# Patient Record
Sex: MALE | Race: OTHER | HISPANIC OR LATINO | URBAN - METROPOLITAN AREA
[De-identification: names, ages, dates, MRNs, and addresses within clinical notes are randomized per-mention and may not be internally consistent; named-entity substitution may affect disease eponyms.]

---

## 2018-06-24 ENCOUNTER — EMERGENCY (EMERGENCY)
Facility: HOSPITAL | Age: 38
LOS: 1 days | Discharge: HOME | End: 2018-06-24
Attending: EMERGENCY MEDICINE
Payer: SELF-PAY

## 2018-06-24 VITALS
HEART RATE: 129 BPM | SYSTOLIC BLOOD PRESSURE: 115 MMHG | DIASTOLIC BLOOD PRESSURE: 65 MMHG | TEMPERATURE: 99 F | RESPIRATION RATE: 18 BRPM | OXYGEN SATURATION: 100 %

## 2018-06-24 PROCEDURE — 82962 GLUCOSE BLOOD TEST: CPT

## 2018-06-24 PROCEDURE — 99285 EMERGENCY DEPT VISIT HI MDM: CPT

## 2018-06-24 PROCEDURE — 99283 EMERGENCY DEPT VISIT LOW MDM: CPT

## 2018-06-24 NOTE — ED PROVIDER NOTE - MEDICAL DECISION MAKING DETAILS
37yo M BIB police for intox - hx unclear. able to hold conversation, no gross signs of trauma or pain. denies pain. will hold labs.
Patient

## 2018-06-24 NOTE — ED PROVIDER NOTE - ATTENDING CONTRIBUTION TO CARE
Male brought to the ED due to his having been found intoxicated outside. He says he drank alcohol but does not provide the type and/or quantity consumed. His intoxication is generalized, constant, mild and not a/w any additional symptoms. On exam, he patient appears well and comfortable but mildly intoxicated. VSs noted, AOB, neck supple, breathing c/ ease, extremities s/ asymmetry, skin s/ rash and neurologically intact. There is nothing clinically evident to suggest any emergent process. Presentation c/w mild alcohol intoxication as opposed to any alternative or additional process. To be discharged once clinically sober.

## 2018-06-24 NOTE — ED ADULT NURSE REASSESSMENT NOTE - NS ED NURSE REASSESS COMMENT FT1
Received report from Vicki Macario RN. Pt observed resting comfortably in bed. Pt denies any needs at this time. Pt provided ginger ale and turkey sandwich. Pt repeatedly expressing want to leave. MD Jolene at bedside and aware.

## 2018-06-24 NOTE — ED PROVIDER NOTE - PROGRESS NOTE DETAILS
Wants to go home. Awake. Alert. Ambulating without difficulty. Suitable for DC at this time. Wants to go home. Awake. Alert. Ambulating without difficulty. Suitable for DC at this time. Patient said his name is Thuan Walters.

## 2018-06-24 NOTE — ED ADULT NURSE NOTE - OBJECTIVE STATEMENT
1830  to [facility] via [facility] ambulance with c/o intoxication. pt drank unknown quantiy of alcohol as per medic pt was found in street pt denies any com,plaints

## 2018-06-24 NOTE — ED PROVIDER NOTE - PHYSICAL EXAMINATION
Gen: tachycardic, speaking in complete sentences. not in distress. able to hold conversation  Head: NCAT  HEENT: PERRL, MMM, normal conjunctiva, anicteric, neck supple  Lung: CTAB, no adventitious sounds  CV: RRR, no murmurs, rubs or gallops  Abd: soft, NTND, no rebound or guarding, no CVAT  MSK: No edema, no visible deformities  Neuro: No focal neurologic deficits. CN II-XII grossly intact. moving all extremities without difficulty  Skin: Warm and dry, no evidence of rash

## 2018-06-24 NOTE — ED PROVIDER NOTE - OBJECTIVE STATEMENT
39yo M with unknown PMH presents today with intoxication, alcohol. had "cheap alcohol" - "everything" per pt. Pt noncompliant with interview.

## 2024-04-21 ENCOUNTER — EMERGENCY (EMERGENCY)
Facility: HOSPITAL | Age: 44
LOS: 1 days | Discharge: HOME | End: 2024-04-21
Attending: STUDENT IN AN ORGANIZED HEALTH CARE EDUCATION/TRAINING PROGRAM
Payer: SELF-PAY

## 2024-04-21 VITALS
RESPIRATION RATE: 20 BRPM | DIASTOLIC BLOOD PRESSURE: 83 MMHG | WEIGHT: 139.99 LBS | SYSTOLIC BLOOD PRESSURE: 133 MMHG | OXYGEN SATURATION: 95 % | HEART RATE: 103 BPM | HEIGHT: 66 IN | TEMPERATURE: 97 F

## 2024-04-21 PROCEDURE — 82962 GLUCOSE BLOOD TEST: CPT

## 2024-04-21 PROCEDURE — 99285 EMERGENCY DEPT VISIT HI MDM: CPT

## 2024-04-21 PROCEDURE — 99283 EMERGENCY DEPT VISIT LOW MDM: CPT

## 2024-04-21 NOTE — ED PROVIDER NOTE - NSFOLLOWUPINSTRUCTIONS_ED_ALL_ED_FT
Alcohol Intoxication  Alcohol intoxication happens when you cannot think clearly or function well after drinking alcohol. This can happen after just one drink. The effect that alcohol has on how you think and function depends on:  How much alcohol you drank.  Your age, your weight, and whether you are a man or a woman.  How often you drink alcohol.  If you have other medical problems.  Alcohol intoxication can range from mild to severe. It can be dangerous, especially if:  You drink a large amount of alcohol in a short time (binge drink).  You drink alcohol and take drugs or medicines.  What are the causes?  This condition is caused by drinking alcohol.    What increases the risk?  Peer pressure in young adults.  Difficulty managing stress.  History of drug or alcohol abuse.  Drinking alcohol and taking drugs.  Family history of drug or alcohol abuse.  Low body weight.  Binge drinking.  What are the signs or symptoms?  Feeling relaxed or sleepy.  Having mild difficulty with coordination, speech, memory, or attention.  Strong anger or extreme sadness.  Severe difficulty with coordination, speech, memory, or attention.  Other symptoms may include:  Passing out.  Vomiting.  Confusion.  Slow breathing.  Coma.  How is this treated?  This condition may be treated with:  Close monitoring in the hospital.  IV fluids to add water in your body.  Medicine to treat vomiting or to get rid of alcohol in the body.  Counseling about the dangers of alcohol.  Oxygen therapy or a breathing machine (ventilator).  You may also be treated for substance use disorder.    Follow these instructions at home:  Eating and drinking    A 12-ounce bottle of beer, a 5-ounce glass of wine, and a 1.5-ounce shot of hard liquor.  Do not drink alcohol if:  Your doctor tells you not to drink.  You are pregnant, may be pregnant, or are planning to get pregnant.  You are under the legal drinking age (21 years old in the [place]  You are taking medicines that you should not take with alcohol.  Alcohol causes your medical problem to get worse.  You have to drive or do activities that need you to be alert.  You have substance use disorder. This is when using alcohol again and again causes problems with your health, your relationships, or with what you need to do at work, home, or school.  Ask your doctor if alcohol is safe for you. If you are allowed to drink, limit how much you have to:  0–1 drink a day for women who are not pregnant.  0–2 drinks a day for men.  Know how much alcohol is in your drink. In the [place] one drink equals one 12 oz bottle of beer (355 mL), one 5 oz glass of wine (148 mL), or one 1½ oz glass of hard liquor (44 mL).  Be sure to eat before you drink alcohol.  Alcohol increases peeing. It is important to stay hydrated and avoid caffeine.  Caffeine may be in coffee, tea, and some sodas.  Caffeine can make you thirsty.  Do not drink more than one drink in an hour.  If you are having more than one drink, have a drink without alcohol (such as water) between your drinks.  General instructions    A sign showing that a person should not drive.  Take over-the-counter and prescription medicines only as told by your doctor.  Do not drive after drinking any amount of alcohol. Plan for a designated  or another way to go home.  Have someone you trust stay with you while you are intoxicated. Youshould not be left alone.  Contact a doctor if:  You do not feel better after a few days.  You have problems at work, at school, or at home due to drinking.  You have trouble with any of the following:  Movement.  Talking.  Memory.  Paying attention to things.  Get help right away if:  You have trouble staying awake.  You are light-headed or faint.  You feel very confused.  You have trouble staying awake.  You are vomiting blood. The blood may be bright red or look like coffee grounds.  You have been told that you have had jerky movements that you cannot control (seizure).  You have blood in your poop (stool). The blood may:  Be bright red.  Make your poop black and tarry and make it smell bad.  These symptoms may be an emergency. Get help right away. Call 911.  Do not wait to see if the symptoms will go away.  Do not drive yourself to the hospital.  Also, get help right away if:  You have thoughts about hurting yourself or others.  Take one of these steps if you feel like you may hurt yourself or others, or have thoughts about taking your own life:  Call 911.  Call the National Suicide Prevention Lifeline at 1-145.785.9223 or 007. This is open 24 hours a day.  Text the Crisis Text Line at 622857.  Summary  Alcohol intoxication happens when you cannot think clearly or function well after drinking alcohol. This can happen after just one drink.  If your doctor says that alcohol is safe for you, limit how much you drink.  Contact a doctor if you have problems at work, at school, or at home due to drinking.  Get help right away if you have thoughts about hurting yourself or others.

## 2024-04-21 NOTE — ED ADULT NURSE NOTE - NS ED NURSE ELOPE COMMENTS
Pt eloped; Dr. Raymundo and RN Karol trying to talk to the pt but to no avail. He verbalized that he want to go home. Pt in stable gait. Charge nurse azul made aware

## 2024-04-21 NOTE — ED PROVIDER NOTE - CLINICAL SUMMARY MEDICAL DECISION MAKING FREE TEXT BOX
Patient is brought in by EMS after being found in the streets drunk.  Patient does not have any complaints at this time does not want to be in the hospital.  Patient appears intoxicated.  Unstable gait.  No respiratory distress.  Differential diagnoses include but not limited to intoxication.  Spoke with patient's family member over the phone who states she will send someone to pick the patient up.  Based on clinical exam and history–no interventions are needed at this time. Patient is brought in by EMS after being found in the streets drunk.  Patient does not have any complaints at this time does not want to be in the hospital.  Patient appears intoxicated.  Unstable gait.  No respiratory distress.  Differential diagnoses include but not limited to intoxication.  Spoke with patient's family member over the patient's phone who states she will send someone to pick the patient up.  Based on clinical exam and history–no interventions are needed at this time.

## 2024-04-21 NOTE — ED PROVIDER NOTE - PROGRESS NOTE DETAILS
Patient walked out of the ED.  Went outside on the street to request the patient to return to the emergency department. Patient kept walking. He had steady gait. Charge RN is made aware/ANM is made aware  Pt had told the staff earlier that his family is waiting outside for him.
